# Patient Record
Sex: MALE | Race: WHITE | NOT HISPANIC OR LATINO | URBAN - METROPOLITAN AREA
[De-identification: names, ages, dates, MRNs, and addresses within clinical notes are randomized per-mention and may not be internally consistent; named-entity substitution may affect disease eponyms.]

---

## 2022-09-14 ENCOUNTER — OFFICE (OUTPATIENT)
Dept: URBAN - METROPOLITAN AREA CLINIC 9 | Facility: CLINIC | Age: 72
End: 2022-09-14

## 2022-09-14 VITALS
DIASTOLIC BLOOD PRESSURE: 72 MMHG | OXYGEN SATURATION: 98 % | HEIGHT: 69 IN | HEART RATE: 62 BPM | SYSTOLIC BLOOD PRESSURE: 136 MMHG | RESPIRATION RATE: 15 BRPM | WEIGHT: 147 LBS

## 2022-09-14 DIAGNOSIS — R15.9 FULL INCONTINENCE OF FECES: ICD-10-CM

## 2022-09-14 DIAGNOSIS — R19.7 DIARRHEA, UNSPECIFIED: ICD-10-CM

## 2022-09-14 LAB
CBC, PLATELET, NO DIFFERENTIAL: HEMATOCRIT: 41.3 % (ref 37.5–51)
CBC, PLATELET, NO DIFFERENTIAL: HEMOGLOBIN: 13.7 G/DL (ref 13–17.7)
CBC, PLATELET, NO DIFFERENTIAL: MCH: 30.7 PG (ref 26.6–33)
CBC, PLATELET, NO DIFFERENTIAL: MCHC: 33.2 G/DL (ref 31.5–35.7)
CBC, PLATELET, NO DIFFERENTIAL: MCV: 93 FL (ref 79–97)
CBC, PLATELET, NO DIFFERENTIAL: PLATELETS: 251 X10E3/UL (ref 150–450)
CBC, PLATELET, NO DIFFERENTIAL: RBC: 4.46 X10E6/UL (ref 4.14–5.8)
CBC, PLATELET, NO DIFFERENTIAL: RDW: 12.4 % (ref 11.6–15.4)
CBC, PLATELET, NO DIFFERENTIAL: WBC: 5.3 X10E3/UL (ref 3.4–10.8)
COMP. METABOLIC PANEL (14): A/G RATIO: 2 (ref 1.2–2.2)
COMP. METABOLIC PANEL (14): ALBUMIN: 4.3 G/DL (ref 3.7–4.7)
COMP. METABOLIC PANEL (14): ALKALINE PHOSPHATASE: 79 IU/L (ref 44–121)
COMP. METABOLIC PANEL (14): ALT (SGPT): 8 IU/L (ref 0–44)
COMP. METABOLIC PANEL (14): AST (SGOT): 15 IU/L (ref 0–40)
COMP. METABOLIC PANEL (14): BILIRUBIN, TOTAL: 0.2 MG/DL (ref 0–1.2)
COMP. METABOLIC PANEL (14): BUN/CREATININE RATIO: 28 — HIGH (ref 10–24)
COMP. METABOLIC PANEL (14): BUN: 18 MG/DL (ref 8–27)
COMP. METABOLIC PANEL (14): CALCIUM: 9.4 MG/DL (ref 8.6–10.2)
COMP. METABOLIC PANEL (14): CARBON DIOXIDE, TOTAL: 24 MMOL/L (ref 20–29)
COMP. METABOLIC PANEL (14): CHLORIDE: 101 MMOL/L (ref 96–106)
COMP. METABOLIC PANEL (14): CREATININE: 0.65 MG/DL — LOW (ref 0.76–1.27)
COMP. METABOLIC PANEL (14): EGFR: 101 ML/MIN/1.73 (ref 59–?)
COMP. METABOLIC PANEL (14): GLOBULIN, TOTAL: 2.2 G/DL (ref 1.5–4.5)
COMP. METABOLIC PANEL (14): GLUCOSE: 100 MG/DL — HIGH (ref 65–99)
COMP. METABOLIC PANEL (14): POTASSIUM: 4.2 MMOL/L (ref 3.5–5.2)
COMP. METABOLIC PANEL (14): PROTEIN, TOTAL: 6.5 G/DL (ref 6–8.5)
COMP. METABOLIC PANEL (14): SODIUM: 139 MMOL/L (ref 134–144)
IMMUNOGLOBULIN A, QN, SERUM: 235 MG/DL (ref 61–437)
T-TRANSGLUTAMINASE (TTG) IGA: <2 U/ML

## 2022-09-14 PROCEDURE — 99203 OFFICE O/P NEW LOW 30 MIN: CPT | Performed by: NURSE PRACTITIONER

## 2022-09-15 LAB
C DIFFICILE TOXINS A+B, EIA: NEGATIVE
GI PROFILE, STOOL, PCR: ADENOVIRUS F 40/41: NOT DETECTED
GI PROFILE, STOOL, PCR: ASTROVIRUS: NOT DETECTED
GI PROFILE, STOOL, PCR: C DIFFICILE TOXIN A/B: DETECTED
GI PROFILE, STOOL, PCR: CAMPYLOBACTER: NOT DETECTED
GI PROFILE, STOOL, PCR: CRYPTOSPORIDIUM: NOT DETECTED
GI PROFILE, STOOL, PCR: CYCLOSPORA CAYETANENSIS: NOT DETECTED
GI PROFILE, STOOL, PCR: E COLI O157: (no result)
GI PROFILE, STOOL, PCR: ENTAMOEBA HISTOLYTICA: NOT DETECTED
GI PROFILE, STOOL, PCR: ENTEROAGGREGATIVE E COLI: NOT DETECTED
GI PROFILE, STOOL, PCR: ENTEROPATHOGENIC E COLI: NOT DETECTED
GI PROFILE, STOOL, PCR: ENTEROTOXIGENIC E COLI: NOT DETECTED
GI PROFILE, STOOL, PCR: GIARDIA LAMBLIA: NOT DETECTED
GI PROFILE, STOOL, PCR: NOROVIRUS GI/GII: NOT DETECTED
GI PROFILE, STOOL, PCR: PLESIOMONAS SHIGELLOIDES: NOT DETECTED
GI PROFILE, STOOL, PCR: ROTAVIRUS A: NOT DETECTED
GI PROFILE, STOOL, PCR: SALMONELLA: NOT DETECTED
GI PROFILE, STOOL, PCR: SAPOVIRUS: NOT DETECTED
GI PROFILE, STOOL, PCR: SHIGA-TOXIN-PRODUCING E COLI: NOT DETECTED
GI PROFILE, STOOL, PCR: SHIGELLA/ENTEROINVASIVE E COLI: NOT DETECTED
GI PROFILE, STOOL, PCR: VIBRIO CHOLERAE: NOT DETECTED
GI PROFILE, STOOL, PCR: VIBRIO: NOT DETECTED
GI PROFILE, STOOL, PCR: YERSINIA ENTEROCOLITICA: NOT DETECTED

## 2022-10-12 ENCOUNTER — OFFICE (OUTPATIENT)
Dept: URBAN - METROPOLITAN AREA CLINIC 9 | Facility: CLINIC | Age: 72
End: 2022-10-12

## 2022-10-12 VITALS
HEART RATE: 68 BPM | WEIGHT: 146 LBS | HEIGHT: 69 IN | DIASTOLIC BLOOD PRESSURE: 64 MMHG | RESPIRATION RATE: 15 BRPM | SYSTOLIC BLOOD PRESSURE: 133 MMHG | OXYGEN SATURATION: 99 %

## 2022-10-12 DIAGNOSIS — A04.72 ENTEROCOLITIS DUE TO CLOSTRIDIUM DIFFICILE, NOT SPECIFIED AS: ICD-10-CM

## 2022-10-12 PROCEDURE — 99213 OFFICE O/P EST LOW 20 MIN: CPT | Performed by: NURSE PRACTITIONER

## 2022-10-12 NOTE — SERVICEHPINOTES
Mr. Do  is a pleasant 71-year-old  male with   A PMH of diverticulosis, otherwise unremarkable.   Patient presents for follow-up for infectious diarrhea and stool molecular panel positive for C diff 09/15/2022.  Vancomycin 125 mg p.o. q.6 hours times 14 days sent in to his pharmacy.  He has completed treatment. Labs 09/14/2022 with WBC 5.3, hemoglobin 13, hematocrit 41, celiac serology unrevealing, total bilirubin 0.2, alkaline phosphatase 79, AST 15, ALT 8, BUN 18, creatinine 0.65, .   Patient states he completed all of his medication a couple weeks ago and is still having some abnormalities in his stools.  Having 1-2 loose stools per day and Monday had an episode of cramping.  Very mild scant BRBPR.  Typically constipated at baseline.  Will reorder stool for C diff to make sure it has resolved and if not we can treat with Dificid p.o. b.i.d. times 10 days.  No fever, chills, sweats.  No nausea vomiting.br
br
OV HPI 9/14/22:br Patient presents due to change in bowel habits after completing a round of antibiotics with ciprofloxacin and Flagyl for questionable diverticulitis where he was seen at a walk-in clinic.  On August 20, 2022 he was prescribed 10 days of Cipro and metronidazole for questionable diverticulitis.  No CT imaging was obtained.  He denies having left lower quadrant pain at all prior to being prescribed antibiotics.  He states he completed the antibiotics and a day later started having diarrhea.  Passing 2 loose/ mucus-like stools per day.  Denies any excessive watery stools.  No melena or hematochezia.  States there is some tint of blood in the mucus.   No fever, chills, sweats.  No abdominal pain or cramping.  No nausea or vomiting, heartburn or reflux.  Patient prior to this was having daily bowel movements.  He does admit to occasional fecal seepage when he urinates.  No anorectal pain, hemorrhoidal issues.  Patient has had colonoscopy at [facility] October 2020 with findings of diverticulosis of the left colon and recommended a recall in 10 years.  No primary family history of colon cancer, stomach cancer, other GI issues.

## 2022-10-12 NOTE — SERVICENOTES
If stool for C diff is still positive, will treat with Dificid p.o. b.i.d. times 10 days.  If stool for C diff is negative, continue probiotic and low FODMAP diet.  He can follow up as needed.  Had colonoscopy a couple years ago at [facility] as above and everything looked okay and he is due for recall in 10 years.

## 2022-10-13 LAB — C DIFFICILE TOXINS A+B, EIA: POSITIVE

## 2022-11-08 ENCOUNTER — OFFICE (OUTPATIENT)
Dept: URBAN - METROPOLITAN AREA CLINIC 9 | Facility: CLINIC | Age: 72
End: 2022-11-08

## 2022-11-08 VITALS
HEART RATE: 72 BPM | HEIGHT: 69 IN | SYSTOLIC BLOOD PRESSURE: 119 MMHG | WEIGHT: 143 LBS | DIASTOLIC BLOOD PRESSURE: 62 MMHG

## 2022-11-08 DIAGNOSIS — A04.72 ENTEROCOLITIS DUE TO CLOSTRIDIUM DIFFICILE, NOT SPECIFIED AS: ICD-10-CM

## 2022-11-08 PROCEDURE — 99213 OFFICE O/P EST LOW 20 MIN: CPT | Performed by: NURSE PRACTITIONER

## 2022-11-08 RX ORDER — VANCOMYCIN HYDROCHLORIDE 125 MG/1
CAPSULE ORAL
Qty: 91 | Refills: 0 | Status: COMPLETED
Start: 2022-11-08 | End: 2023-01-13

## 2022-11-17 ENCOUNTER — OFFICE (OUTPATIENT)
Dept: URBAN - METROPOLITAN AREA CLINIC 11 | Facility: CLINIC | Age: 72
End: 2022-11-17

## 2022-11-17 VITALS
HEIGHT: 69 IN | HEART RATE: 80 BPM | SYSTOLIC BLOOD PRESSURE: 122 MMHG | HEART RATE: 72 BPM | RESPIRATION RATE: 18 BRPM | DIASTOLIC BLOOD PRESSURE: 66 MMHG | DIASTOLIC BLOOD PRESSURE: 65 MMHG | HEART RATE: 73 BPM | DIASTOLIC BLOOD PRESSURE: 68 MMHG | DIASTOLIC BLOOD PRESSURE: 63 MMHG | WEIGHT: 140.6 LBS | SYSTOLIC BLOOD PRESSURE: 116 MMHG | SYSTOLIC BLOOD PRESSURE: 120 MMHG | SYSTOLIC BLOOD PRESSURE: 118 MMHG | TEMPERATURE: 97.6 F | HEART RATE: 81 BPM

## 2022-11-17 DIAGNOSIS — A04.71 ENTEROCOLITIS DUE TO CLOSTRIDIUM DIFFICILE, RECURRENT: ICD-10-CM

## 2022-11-17 PROCEDURE — 96413 CHEMO IV INFUSION 1 HR: CPT | Performed by: NURSE PRACTITIONER

## 2022-11-17 NOTE — SERVICENOTES
Patient observed for 15 minutes post infusion. 
Patient denies chest pain, shortness of breath or dizziness.  
Handout given and reviewed with patient.
Patient was instructed on common side effects.
Patient verbalized a complete understanding and has no questions.

## 2022-12-03 ENCOUNTER — INPATIENT HOSPITAL (OUTPATIENT)
Dept: URBAN - METROPOLITAN AREA HOSPITAL 130 | Facility: HOSPITAL | Age: 72
End: 2022-12-03
Payer: MEDICARE

## 2022-12-03 DIAGNOSIS — R33.9 RETENTION OF URINE, UNSPECIFIED: ICD-10-CM

## 2022-12-03 DIAGNOSIS — A04.72 ENTEROCOLITIS DUE TO CLOSTRIDIUM DIFFICILE, NOT SPECIFIED AS: ICD-10-CM

## 2022-12-03 PROCEDURE — 99222 1ST HOSP IP/OBS MODERATE 55: CPT | Performed by: INTERNAL MEDICINE

## 2022-12-04 ENCOUNTER — INPATIENT HOSPITAL (OUTPATIENT)
Dept: URBAN - METROPOLITAN AREA HOSPITAL 130 | Facility: HOSPITAL | Age: 72
End: 2022-12-04
Payer: MEDICARE

## 2022-12-04 DIAGNOSIS — A04.72 ENTEROCOLITIS DUE TO CLOSTRIDIUM DIFFICILE, NOT SPECIFIED AS: ICD-10-CM

## 2022-12-04 DIAGNOSIS — R33.9 RETENTION OF URINE, UNSPECIFIED: ICD-10-CM

## 2022-12-04 PROCEDURE — 99232 SBSQ HOSP IP/OBS MODERATE 35: CPT | Performed by: INTERNAL MEDICINE

## 2022-12-08 ENCOUNTER — OFFICE (OUTPATIENT)
Dept: URBAN - METROPOLITAN AREA CLINIC 9 | Facility: CLINIC | Age: 72
End: 2022-12-08

## 2022-12-08 VITALS
WEIGHT: 140 LBS | HEIGHT: 69 IN | RESPIRATION RATE: 15 BRPM | SYSTOLIC BLOOD PRESSURE: 110 MMHG | OXYGEN SATURATION: 94 % | HEART RATE: 77 BPM | DIASTOLIC BLOOD PRESSURE: 62 MMHG

## 2022-12-08 DIAGNOSIS — A04.71 ENTEROCOLITIS DUE TO CLOSTRIDIUM DIFFICILE, RECURRENT: ICD-10-CM

## 2022-12-08 DIAGNOSIS — N40.0 BENIGN PROSTATIC HYPERPLASIA WITHOUT LOWER URINARY TRACT SYM: ICD-10-CM

## 2022-12-08 DIAGNOSIS — N17.9 ACUTE KIDNEY FAILURE, UNSPECIFIED: ICD-10-CM

## 2022-12-08 DIAGNOSIS — N13.1: ICD-10-CM

## 2022-12-08 PROCEDURE — 99214 OFFICE O/P EST MOD 30 MIN: CPT | Performed by: NURSE PRACTITIONER

## 2022-12-08 NOTE — SERVICEHPINOTES
Mr. Do is a pleasant 71-year-old  male with  A PMH of diverticulosis, otherwise unremarkable.  patient was originally seen and evaluated 09/14/2022 as below and stool studies revealed C diff.  Was started on vancomycin 125 milligrams p.o. Q 6 hours times 14 days.  See below for details.  Persistent C diff on 10/12/2022 so Zinplava infusion and tapering vanco prescribed. Vancomycin taper was just prescribed 11/08/2022 125 milligrams to take 1 capsule 4 times a day for 2 weeks then 1 capsule twice a day x1 week and then 1 capsule daily x1 week and then 1 capsule every other day x4 weeks then discontinue. Labs were obtained 12/01/2022 with Zinplava infusion noting acute renal failure and he was sent to the ER for further evaluation at [facility]. Labs gastro 1 with a WBC 22, hemoglobin 10, hematocrit 31, BUN 92, creatinine 4.29, GFR 14. Immediately called patient after labs obtained and notified him to go to the nearest emergency room, which was [facility] for further evaluation. Patient was admitted and records extensively reviewed. Dr. Daniel Horta with gastroenterology was also consulted for persistent C diff. Urology, Dr. Dietz also consulted due to enlarged prostate with urinary outlet obstruction and bilateral moderate hydroureteronephrosis. Findings suspicious for prostate cancer. Marked thickening of the rectal wall with loss of fat plane between the rectum and prostate which may represent tumor involvement. Patient currently has chronic indwelling De Souza catheter with plans to remove Monday with potential workup of his prostate and urinary obstruction. Patient states Urology told him they thought his urinary findings were due to C diff. 
br
br Patient states he was taking the vancomycin taper as directed and was down to 1 tablet every single day before going to the ER. When he was admitted to the ER they started vancomycin 125 milligrams p.o. q.6 hours in actually sent him home on prescription reflecting this and not to continue the taper.Patient states his stools are now starting to have more formed. He never was having watery diarrhea, but was having multiple loose/semi loose stools per day. Approximately fiber more per day. Now he is having less frequency and stool starting to have some form. He is demanding different treatment if his symptoms do not get better in the next few days. Urged him to continue vancomycin taper as directed as his stools are getting more formed and less frequent. Would not repeat stool for C diff at this time. Colonoscopy 2020 and recommended a 10 year recall at that time. 
[facility] HPI 11/8/22:br   Patient presents for follow-up on C diff. He was recently seen 10/12/2022 is still complaining of lower abdominal cramping and diarrhea. Repeat stool for C diff was ordered and he tested positive. Dificid 250 milligrams p.o. b.i.d. times 10 days was sent in, but insurance denied. It was then recommended he do vancomycin tapering for the next several weeks. He refused to take vancomycin again and refused this and Plavix infusion. Patient shows up today with no improvement in symptoms and having some scant blood in his stools. He was explained in depth about his white a positive stool for C diff meant and possibilities of worsening symptoms and sepsis if he does not treat this. He agrees after lengthy discussion to try tapering vancomycin and Zinplava infusion. no fever, chills, sweats. Does admit to scant BRBPR mixed in stool at times. Mild lower abdominal cramping. No nausea or vomiting.brOV HPI 10/12/22:br Patient presents for follow-up for infectious diarrhea and stool molecular panel positive for C diff 09/15/2022. Vancomycin 125 mg p.o. q.6 hours times 14 days sent in to his pharmacy. He has completed treatment. Labs 09/14/2022 with WBC 5.3, hemoglobin 13, hematocrit 41, celiac serology unrevealing, total bilirubin 0.2, alkaline phosphatase 79, AST 15, ALT 8, BUN 18, creatinine 0.65, .  Patient states he completed all of his medication a couple weeks ago and is still having some abnormalities in his stools. Having 1-2 loose stools per day and Monday had an episode of cramping. Very mild scant BRBPR. Typically constipated at baseline. Will reorder stool for C diff to make sure it has resolved and if not we can treat with Dificid p.o. b.i.d. times 10 days. No fever, chills, sweats. No nausea vomiting.OV HPI 9/14/22:brPatient presents due to change in bowel habits after completing a round of antibiotics with ciprofloxacin and Flagyl for questionable diverticulitis where he was seen at a walk-in clinic. On August 20, 2022 he was prescribed 10 days of Cipro and metronidazole for questionable diverticulitis. No CT imaging was obtained. He denies having left lower quadrant pain at all prior to being prescribed antibiotics. He states he completed the antibiotics and a day later started having diarrhea. Passing 2 loose/ mucus-like stools per day. Denies any excessive watery stools. No melena or hematochezia. States there is some tint of blood in the mucus.  No fever, chills, sweats. No abdominal pain or cramping. No nausea or vomiting, heartburn or reflux. Patient prior to this was having daily bowel movements. He does admit to occasional fecal seepage when he urinates. No anorectal pain, hemorrhoidal issues. Patient has had colonoscopy at [facility] October 2020 with findings of diverticulosis of the left colon and recommended a recall in 10 years. No primary family history of colon cancer, stomach cancer, other GI issues. 
[facility]

## 2022-12-08 NOTE — SERVICENOTES
Will for to Dr. Jernigan for any further recommendations.  Patient is  finishing up the vancomycin taper and already received Zinplava infusion prior to hospitalization. Patient is concerned the vanco taper will not work (even though he is slowly improving) and questioning about FMT?

## 2023-01-13 ENCOUNTER — OFFICE (OUTPATIENT)
Dept: URBAN - METROPOLITAN AREA CLINIC 9 | Facility: CLINIC | Age: 73
End: 2023-01-13

## 2023-01-13 VITALS
WEIGHT: 143 LBS | OXYGEN SATURATION: 98 % | HEIGHT: 69 IN | DIASTOLIC BLOOD PRESSURE: 68 MMHG | SYSTOLIC BLOOD PRESSURE: 116 MMHG | HEART RATE: 73 BPM

## 2023-01-13 DIAGNOSIS — A04.72 ENTEROCOLITIS DUE TO CLOSTRIDIUM DIFFICILE, NOT SPECIFIED AS: ICD-10-CM

## 2023-01-13 DIAGNOSIS — R19.7 DIARRHEA, UNSPECIFIED: ICD-10-CM

## 2023-01-13 PROCEDURE — 99213 OFFICE O/P EST LOW 20 MIN: CPT | Performed by: NURSE PRACTITIONER

## 2023-01-13 RX ORDER — SACCHAROMYCES BOULARDII 50 MG
500 CAPSULE ORAL
Qty: 60 | Refills: 3 | Status: COMPLETED
Start: 2023-01-13 | End: 2023-02-21

## 2023-01-13 NOTE — SERVICENOTES
If still testing positive for C diff, would proceed with fecal microbiota transplant at [facility] by Dr. Jernigan.

## 2023-01-14 LAB
CBC, PLATELET, NO DIFFERENTIAL: HEMATOCRIT: 34.1 % — LOW (ref 37.5–51)
CBC, PLATELET, NO DIFFERENTIAL: HEMOGLOBIN: 10.9 G/DL — LOW (ref 13–17.7)
CBC, PLATELET, NO DIFFERENTIAL: MCH: 29.5 PG (ref 26.6–33)
CBC, PLATELET, NO DIFFERENTIAL: MCHC: 32 G/DL (ref 31.5–35.7)
CBC, PLATELET, NO DIFFERENTIAL: MCV: 92 FL (ref 79–97)
CBC, PLATELET, NO DIFFERENTIAL: NRBC: (no result)
CBC, PLATELET, NO DIFFERENTIAL: PLATELETS: 281 X10E3/UL (ref 150–450)
CBC, PLATELET, NO DIFFERENTIAL: RBC: 3.69 X10E6/UL — LOW (ref 4.14–5.8)
CBC, PLATELET, NO DIFFERENTIAL: RDW: 13.4 % (ref 11.6–15.4)
CBC, PLATELET, NO DIFFERENTIAL: WBC: 6.9 X10E3/UL (ref 3.4–10.8)

## 2023-01-18 LAB — C DIFFICILE TOXINS A+B, EIA: NEGATIVE

## 2023-02-21 ENCOUNTER — AMBULATORY SURGICAL CENTER (OUTPATIENT)
Dept: URBAN - METROPOLITAN AREA SURGERY 3 | Facility: SURGERY | Age: 73
End: 2023-02-21
Payer: MEDICARE

## 2023-02-21 ENCOUNTER — OFFICE (OUTPATIENT)
Dept: URBAN - METROPOLITAN AREA PATHOLOGY 22 | Facility: PATHOLOGY | Age: 73
End: 2023-02-21
Payer: MEDICARE

## 2023-02-21 VITALS
DIASTOLIC BLOOD PRESSURE: 76 MMHG | HEART RATE: 59 BPM | DIASTOLIC BLOOD PRESSURE: 72 MMHG | SYSTOLIC BLOOD PRESSURE: 104 MMHG | DIASTOLIC BLOOD PRESSURE: 67 MMHG | HEART RATE: 74 BPM | HEART RATE: 75 BPM | HEIGHT: 69 IN | SYSTOLIC BLOOD PRESSURE: 104 MMHG | SYSTOLIC BLOOD PRESSURE: 125 MMHG | DIASTOLIC BLOOD PRESSURE: 65 MMHG | HEART RATE: 60 BPM | DIASTOLIC BLOOD PRESSURE: 67 MMHG | RESPIRATION RATE: 16 BRPM | WEIGHT: 140 LBS | RESPIRATION RATE: 18 BRPM | TEMPERATURE: 98.1 F | HEART RATE: 72 BPM | SYSTOLIC BLOOD PRESSURE: 120 MMHG | DIASTOLIC BLOOD PRESSURE: 76 MMHG | DIASTOLIC BLOOD PRESSURE: 65 MMHG | DIASTOLIC BLOOD PRESSURE: 63 MMHG | DIASTOLIC BLOOD PRESSURE: 63 MMHG | OXYGEN SATURATION: 99 % | OXYGEN SATURATION: 98 % | HEART RATE: 60 BPM | SYSTOLIC BLOOD PRESSURE: 126 MMHG | WEIGHT: 140 LBS | SYSTOLIC BLOOD PRESSURE: 106 MMHG | OXYGEN SATURATION: 100 % | HEART RATE: 75 BPM | HEIGHT: 69 IN | OXYGEN SATURATION: 99 % | RESPIRATION RATE: 17 BRPM | SYSTOLIC BLOOD PRESSURE: 125 MMHG | HEART RATE: 62 BPM | HEART RATE: 74 BPM | SYSTOLIC BLOOD PRESSURE: 106 MMHG | RESPIRATION RATE: 17 BRPM | HEART RATE: 59 BPM | DIASTOLIC BLOOD PRESSURE: 72 MMHG | OXYGEN SATURATION: 100 % | TEMPERATURE: 98.2 F | SYSTOLIC BLOOD PRESSURE: 118 MMHG | SYSTOLIC BLOOD PRESSURE: 120 MMHG | TEMPERATURE: 98.2 F | HEART RATE: 72 BPM | SYSTOLIC BLOOD PRESSURE: 118 MMHG | OXYGEN SATURATION: 98 % | RESPIRATION RATE: 16 BRPM | HEART RATE: 62 BPM | TEMPERATURE: 98.1 F | SYSTOLIC BLOOD PRESSURE: 126 MMHG | RESPIRATION RATE: 18 BRPM

## 2023-02-21 DIAGNOSIS — K57.30 DIVERTICULOSIS OF LARGE INTESTINE WITHOUT PERFORATION OR ABS: ICD-10-CM

## 2023-02-21 DIAGNOSIS — K92.1 MELENA: ICD-10-CM

## 2023-02-21 DIAGNOSIS — K31.7 POLYP OF STOMACH AND DUODENUM: ICD-10-CM

## 2023-02-21 DIAGNOSIS — D50.9 IRON DEFICIENCY ANEMIA, UNSPECIFIED: ICD-10-CM

## 2023-02-21 DIAGNOSIS — C79.89 SECONDARY MALIGNANT NEOPLASM OF OTHER SPECIFIED SITES: ICD-10-CM

## 2023-02-21 DIAGNOSIS — K56.600 PARTIAL INTESTINAL OBSTRUCTION, UNSPECIFIED AS TO CAUSE: ICD-10-CM

## 2023-02-21 DIAGNOSIS — K29.50 UNSPECIFIED CHRONIC GASTRITIS WITHOUT BLEEDING: ICD-10-CM

## 2023-02-21 PROBLEM — D37.4 NEOPLASM OF UNCERTAIN BEHAVIOR OF COLON: Status: ACTIVE | Noted: 2023-02-21

## 2023-02-21 PROBLEM — K92.2 GASTROINTESTINAL HEMORRHAGE, UNSPECIFIED: Status: ACTIVE | Noted: 2023-02-21

## 2023-02-21 PROBLEM — D53.9 UNEXPLAINED IRON DEFICIENCY ANEMIA: Status: ACTIVE | Noted: 2023-02-21

## 2023-02-21 PROCEDURE — 88313 SPECIAL STAINS GROUP 2: CPT | Performed by: STUDENT IN AN ORGANIZED HEALTH CARE EDUCATION/TRAINING PROGRAM

## 2023-02-21 PROCEDURE — 88342 IMHCHEM/IMCYTCHM 1ST ANTB: CPT | Mod: 59 | Performed by: STUDENT IN AN ORGANIZED HEALTH CARE EDUCATION/TRAINING PROGRAM

## 2023-02-21 PROCEDURE — 45380 COLONOSCOPY AND BIOPSY: CPT | Performed by: SPECIALIST

## 2023-02-21 PROCEDURE — 43239 EGD BIOPSY SINGLE/MULTIPLE: CPT | Mod: 51 | Performed by: SPECIALIST

## 2023-02-21 PROCEDURE — 88305 TISSUE EXAM BY PATHOLOGIST: CPT | Performed by: STUDENT IN AN ORGANIZED HEALTH CARE EDUCATION/TRAINING PROGRAM

## 2023-02-21 PROCEDURE — 88341 IMHCHEM/IMCYTCHM EA ADD ANTB: CPT | Mod: 59 | Performed by: STUDENT IN AN ORGANIZED HEALTH CARE EDUCATION/TRAINING PROGRAM
